# Patient Record
Sex: MALE | Race: WHITE
[De-identification: names, ages, dates, MRNs, and addresses within clinical notes are randomized per-mention and may not be internally consistent; named-entity substitution may affect disease eponyms.]

---

## 2018-03-22 NOTE — [email or web address]
Preanesthetic Assessment





- Anesthesia/Transfusion/Family Hx


Anesthesia History: Prior Anesthesia Without Reaction


Family History of Anesthesia Reaction: No


Transfusion History: No Prior Transfusion(s)





- Review of Systems


General: No Symptoms, Other


Pulmonary: No Symptoms


Cardiovascular: No Symptoms, Other (HTN, high cholesterol, 3 stents 2014)


Gastrointestinal: No Symptoms


Neurological: No Symptoms


Other: Reports: Diabetes (type 2, on oral agents, blood glucose 151 at 0450)





- Physical Assessment


NPO Status Date: 03/22/18


NPO Status Time: 05:00 (had muffin)


Pulse: 79


O2 Sat by Pulse Oximetry: 94


Respiratory Rate: 16


Blood Pressure: 163/70


Weight: 127.006 kg


ASA Class: 2


Mental Status: Alert & Oriented x3


Airway Class: Mallampati = 2


Dentition: Reports: Normal Dentition


Thyro-Mental Finger Breadths: 3


Mouth Opening Finger Breadths: 3


ROM/Head Extension: Full


Lungs: Clear to Auscultation, Normal Respiratory Effort


Cardiovascular: Regular Rate, Regular Rhythm





- Allergies


Allergies/Adverse Reactions: 


 Allergies











Allergy/AdvReac Type Severity Reaction Status Date / Time


 


No Known Allergies Allergy   Verified 03/21/18 15:39














- Blood


Blood Available: No


Product(s) Available: None





- Anesthesia Plan


Pre-Op Medication Ordered: None





- Acknowledgements


Anesthesia Type Planned: MAC


Pt an Appropriate Candidate for the Planned Anesthesia: Yes


Alternatives and Risks of Anesthesia Discussed w Pt/Guardian: Yes


Pt/Guardian Understands and Agrees with Anesthesia Plan: Yes





PreAnesthesia Questionnaire





- HOME MEDS


Home Medications: 


 Home Meds





Aspirin [Laura Chewable Aspirin] 81 mg PO DAILY 03/21/18 [History]


Hydrochlorothiazide [Hydrochlorothiazide] 25 mg PO DAILY 03/21/18 [History]


Lisinopril [Prinivil] 30 mg PO DAILY 03/21/18 [History]


Nitroglycerin 0.4 mg SL ASDIRECTED PRN 03/21/18 [History]


atorvaSTATin Calcium [Atorvastatin Calcium] 80 mg PO DAILY 03/21/18 [History]


metFORMIN HCl [Metformin HCl] 1,000 mg PO BID 03/21/18 [History]











- CURRENT (IN HOUSE) MEDS


Current Meds: 





 Current Medications





Brimonidine Tartrate (Alphagan 0.2% Ophth Soln)  0 ml EYELF ASDIRECTED BEBETO


   Stop: 03/22/18 18:00


Cefuroxime Sodium (Zinacef)  0 mg EYELF ASDIRECTED BEBETO


   Stop: 03/22/18 18:00


Lidocaine HCl (Xylocaine-Mpf 1%)  10 ml INJECT ASDIRECTED BEBETO


   Stop: 03/22/18 18:00


Phenylephrine HCl (Cornelio-Synephrine 2.5% Ophth Soln)  0 ml EYELF ASDIRECTED BEBETO


   Stop: 03/22/18 18:00


Pilocarpine HCl (Pilocar 4% Ophth Soln)  0 ml EYELF ASDIRECTED BEBETO


   Stop: 03/22/18 18:00


Polymyxin/Trimethoprim Sulfate (Polytrim Ophth Soln)  0 ml EYELF ASDIRECTED BEBETO


   Stop: 03/22/18 18:00


Tetracaine HCl (Tetracaine 0.5% Steri-Unit Sol)  0 ml EYELF ASDIRECTED BEBETO


   Stop: 03/22/18 18:00


Tropicamide (Mydriacyl 1% Ophth Soln)  0 ml EYELF ASDIRECTED BEBETO


   Stop: 03/22/18 18:00

## 2018-03-22 NOTE — [id number]
Post Anesthesia Note





- EVALUATION WITHIN 48HRS OF ANESTHETIC


Vital Signs in Normal Range: Yes


Patient Participated in Evaluation: Yes


Respiratory Function Stable: Yes


Airway Patent: Yes


Cardiovascular Function Stable: Yes


Hydration Status Stable: Yes


Pain Control Satisfactory: Yes


Nausea and Vomiting Control Satisfactory: Yes


Mental Status Recovered: Yes


Pulse Rate: 69


SaO2: 98


Resp Rate: 16


Temperature: 37 C


Blood Pressure: 166/100

## 2018-04-19 NOTE — PCM.PREANE
Preanesthetic Assessment





- Anesthesia/Transfusion/Family Hx


Anesthesia History: Prior Anesthesia Without Reaction


Family History of Anesthesia Reaction: No


Transfusion History: No Prior Transfusion(s)





- Review of Systems


General: No Symptoms


Pulmonary: No Symptoms


Cardiovascular: No Symptoms


Gastrointestinal: No Symptoms


Neurological: No Symptoms


Other: Reports: Diabetes, Sinus Problem





- Physical Assessment


NPO Status Date: 04/18/18


NPO Status Time: 19:30


Pulse: 70


O2 Sat by Pulse Oximetry: 94


Respiratory Rate: 16


Blood Pressure: 140/77


Temperature: 97.6 F


Height: 6 ft 1 in


Weight: 127.006 kg


ASA Class: 2


Mental Status: Alert & Oriented x3


Airway Class: Mallampati = 1


Dentition: Reports: Normal Dentition


Thyro-Mental Finger Breadths: 3


Mouth Opening Finger Breadths: 3


ROM/Head Extension: Full


Lungs: Clear to Auscultation, Normal Respiratory Effort


Cardiovascular: Regular Rate, Regular Rhythm





- Allergies


Allergies/Adverse Reactions: 


 Allergies











Allergy/AdvReac Type Severity Reaction Status Date / Time


 


No Known Allergies Allergy   Verified 04/18/18 11:31














- Blood


Blood Available: No





- Acknowledgements


Anesthesia Type Planned: MAC


Pt an Appropriate Candidate for the Planned Anesthesia: Yes


Alternatives and Risks of Anesthesia Discussed w Pt/Guardian: Yes


Pt/Guardian Understands and Agrees with Anesthesia Plan: Yes





PreAnesthesia Questionnaire


Cardiovascular History: Reports: High Cholesterol, Hypertension


Respiratory History: Reports: None


Gastrointestinal History: Reports: None


Musculoskeletal History: Reports: None


Endocrine/Metabolic History: Reports: Diabetes, Type II, Obesity/BMI 30+


Oncologic (Cancer) History: Reports: None





- Past Surgical History


HEENT Surgical History: Reports: Cataract Surgery, Other (See Below) (deviateed 

septum as kid)


GI Surgical History: Reports: Other (See Below) (hemorrhoidectomy)





- SUBSTANCE USE


Smoking Status *Q: Former Smoker (quit 7 years ago)


Tobacco Use Within Last Twelve Months: No


Second Hand Smoke Exposure: No


Days Per Week of Alcohol Use: 2


Number of Drinks Per Day: 2


Total Drinks Per Week: 4


Recreational Drug Use History: No





- HOME MEDS


Home Medications: 


 Home Meds





Aspirin [Laura Chewable Aspirin] 81 mg PO DAILY 03/21/18 [History]


Hydrochlorothiazide 25 mg PO DAILY 03/21/18 [History]


Lisinopril [Prinivil] 30 mg PO DAILY 03/21/18 [History]


Nitroglycerin 0.4 mg SL ASDIRECTED PRN 03/21/18 [History]


atorvaSTATin Calcium [Atorvastatin Calcium] 80 mg PO DAILY 03/21/18 [History]


metFORMIN HCl [Metformin HCl] 1,000 mg PO BID 03/21/18 [History]











- CURRENT (IN HOUSE) MEDS


Current Meds: 





 Current Medications





Brimonidine Tartrate (Alphagan 0.2% Ophth Soln)  0 ml EYERT ASDIRECTED BEBETO


   Stop: 04/19/18 18:00


Cefuroxime Sodium (Zinacef)  0 mg EYERT ASDIRECTED BEBETO


   Stop: 04/19/18 18:00


Lidocaine HCl (Xylocaine-Mpf 1%)  10 ml INJECT ASDIRECTED BEBETO


   Stop: 04/19/18 18:00


Phenylephrine HCl (Cornelio-Synephrine 2.5% Ophth Soln)  0 ml EYERT ASDIRECTED BEBETO


   Stop: 04/19/18 18:00


Pilocarpine HCl (Pilocar 4% Ophth Soln)  0 ml EYERT ASDIRECTED BEBETO


   Stop: 04/19/18 18:00


Polymyxin/Trimethoprim Sulfate (Polytrim Ophth Soln)  0 ml EYERT ASDIRECTED BEBETO


   Stop: 04/19/18 18:00


   Last Admin: 04/19/18 10:43 Dose:  1 drop


Tetracaine HCl (Tetracaine 0.5% Steri-Unit Sol)  0 ml EYERT ASDIRECTED BEBETO


   Stop: 04/19/18 18:00


Tropicamide (Mydriacyl 1% Ophth Soln)  0 ml EYERT ASDIRECTED BEBETO


   Stop: 04/19/18 18:00





Discontinued Medications





Tropicamide (Mydriacyl 1% Ophth Soln)  0 ml EYERT ASDIRECTED BEBETO


   Stop: 04/19/18 18:00

## 2022-06-26 ENCOUNTER — HOSPITAL ENCOUNTER (INPATIENT)
Dept: HOSPITAL 41 - JD.ED | Age: 62
LOS: 2 days | Discharge: HOME | DRG: 351 | End: 2022-06-28
Attending: INTERNAL MEDICINE | Admitting: INTERNAL MEDICINE
Payer: COMMERCIAL

## 2022-06-26 DIAGNOSIS — E66.9: ICD-10-CM

## 2022-06-26 DIAGNOSIS — E78.5: ICD-10-CM

## 2022-06-26 DIAGNOSIS — Z79.84: ICD-10-CM

## 2022-06-26 DIAGNOSIS — Z79.82: ICD-10-CM

## 2022-06-26 DIAGNOSIS — E11.9: ICD-10-CM

## 2022-06-26 DIAGNOSIS — E78.00: ICD-10-CM
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
## 2022-06-26 DIAGNOSIS — S73.101A: Primary | ICD-10-CM

## 2022-06-26 DIAGNOSIS — S76.312A: ICD-10-CM
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
## 2022-06-26 DIAGNOSIS — I10: ICD-10-CM

## 2022-06-26 DIAGNOSIS — Z95.5: ICD-10-CM

## 2022-06-26 DIAGNOSIS — Z98.49: ICD-10-CM

## 2022-06-26 DIAGNOSIS — Z87.891: ICD-10-CM

## 2022-06-26 DIAGNOSIS — Z79.899: ICD-10-CM

## 2022-06-26 LAB — EGFRCR SERPLBLD CKD-EPI 2021: 85 ML/MIN (ref 60–?)

## 2022-06-26 PROCEDURE — G0378 HOSPITAL OBSERVATION PER HR: HCPCS

## 2022-06-26 RX ADMIN — KETOROLAC TROMETHAMINE SCH MG: 15 INJECTION, SOLUTION INTRAMUSCULAR; INTRAVENOUS at 21:48

## 2022-06-27 RX ADMIN — KETOROLAC TROMETHAMINE SCH MG: 15 INJECTION, SOLUTION INTRAMUSCULAR; INTRAVENOUS at 10:08

## 2022-06-27 RX ADMIN — SODIUM CHLORIDE SCH UNIT: 9 INJECTION, SOLUTION INTRAVENOUS at 17:34

## 2022-06-27 RX ADMIN — METHYLPREDNISOLONE SODIUM SUCCINATE SCH MG: 125 INJECTION, POWDER, FOR SOLUTION INTRAMUSCULAR; INTRAVENOUS at 14:38

## 2022-06-27 RX ADMIN — KETOROLAC TROMETHAMINE SCH MG: 15 INJECTION, SOLUTION INTRAMUSCULAR; INTRAVENOUS at 17:27

## 2022-06-27 RX ADMIN — METHYLPREDNISOLONE SODIUM SUCCINATE SCH MG: 125 INJECTION, POWDER, FOR SOLUTION INTRAMUSCULAR; INTRAVENOUS at 21:05

## 2022-06-27 RX ADMIN — SODIUM CHLORIDE SCH UNIT: 9 INJECTION, SOLUTION INTRAVENOUS at 14:39

## 2022-06-27 RX ADMIN — METHYLPREDNISOLONE SODIUM SUCCINATE SCH MG: 125 INJECTION, POWDER, FOR SOLUTION INTRAMUSCULAR; INTRAVENOUS at 03:12

## 2022-06-27 RX ADMIN — KETOROLAC TROMETHAMINE SCH MG: 15 INJECTION, SOLUTION INTRAMUSCULAR; INTRAVENOUS at 03:12

## 2022-06-27 RX ADMIN — KETOROLAC TROMETHAMINE SCH MG: 15 INJECTION, SOLUTION INTRAMUSCULAR; INTRAVENOUS at 21:13

## 2022-06-27 RX ADMIN — SODIUM CHLORIDE SCH: 9 INJECTION, SOLUTION INTRAVENOUS at 20:20

## 2022-06-27 RX ADMIN — METHYLPREDNISOLONE SODIUM SUCCINATE SCH MG: 125 INJECTION, POWDER, FOR SOLUTION INTRAMUSCULAR; INTRAVENOUS at 08:40

## 2022-06-28 RX ADMIN — KETOROLAC TROMETHAMINE SCH MG: 15 INJECTION, SOLUTION INTRAMUSCULAR; INTRAVENOUS at 10:04

## 2022-06-28 RX ADMIN — METHYLPREDNISOLONE SODIUM SUCCINATE SCH: 125 INJECTION, POWDER, FOR SOLUTION INTRAMUSCULAR; INTRAVENOUS at 03:18

## 2022-06-28 RX ADMIN — SODIUM CHLORIDE SCH: 9 INJECTION, SOLUTION INTRAVENOUS at 08:35

## 2022-06-28 RX ADMIN — KETOROLAC TROMETHAMINE SCH MG: 15 INJECTION, SOLUTION INTRAMUSCULAR; INTRAVENOUS at 05:39

## 2022-06-28 RX ADMIN — METHYLPREDNISOLONE SODIUM SUCCINATE SCH MG: 125 INJECTION, POWDER, FOR SOLUTION INTRAMUSCULAR; INTRAVENOUS at 08:52

## 2022-06-28 RX ADMIN — METHYLPREDNISOLONE SODIUM SUCCINATE SCH MG: 125 INJECTION, POWDER, FOR SOLUTION INTRAMUSCULAR; INTRAVENOUS at 00:58
